# Patient Record
Sex: MALE
[De-identification: names, ages, dates, MRNs, and addresses within clinical notes are randomized per-mention and may not be internally consistent; named-entity substitution may affect disease eponyms.]

---

## 2019-02-13 ENCOUNTER — HOSPITAL ENCOUNTER (EMERGENCY)
Dept: HOSPITAL 31 - C.ER | Age: 59
Discharge: HOME | End: 2019-02-13
Payer: MEDICAID

## 2019-02-13 VITALS — TEMPERATURE: 98.2 F

## 2019-02-13 VITALS — HEART RATE: 90 BPM | DIASTOLIC BLOOD PRESSURE: 80 MMHG | OXYGEN SATURATION: 98 % | SYSTOLIC BLOOD PRESSURE: 117 MMHG

## 2019-02-13 VITALS — BODY MASS INDEX: 25.8 KG/M2

## 2019-02-13 VITALS — RESPIRATION RATE: 16 BRPM

## 2019-02-13 DIAGNOSIS — T78.40XA: Primary | ICD-10-CM

## 2019-02-13 PROCEDURE — 96374 THER/PROPH/DIAG INJ IV PUSH: CPT

## 2019-02-13 PROCEDURE — 96375 TX/PRO/DX INJ NEW DRUG ADDON: CPT

## 2019-02-13 PROCEDURE — 96361 HYDRATE IV INFUSION ADD-ON: CPT

## 2019-02-13 PROCEDURE — 99285 EMERGENCY DEPT VISIT HI MDM: CPT

## 2019-02-13 NOTE — C.PDOC
History Of Present Illness


Patient presents to the ER after he woke up at 0400 with swollen lips. He is 

currently able to speak in complete sentences. Denies drooling or known 

allergies.


Time Seen by Provider: 02/13/19 04:34


History Per: Patient


History/Exam Limitations: no limitations


Onset/Duration Of Symptoms: Hrs


Current Symptoms Are (Timing): Still Present


Possible Cause: Unknown


Associated Symptoms: Swelling (Lip)


Severity: Moderate


Pain Scale Rating Of: 4


Recent travel outside of the [place]: No





Past Medical History


Reviewed: Historical Data, Nursing Documentation, Vital Signs





- Medical History


PMH: Anxiety, Depression


   Denies: Diabetes, Hepatitis, HIV, HTN, Seizures, Sexually Transmitted Disease


Family History: States: No Known Family Hx





- Social History


Hx Alcohol Use: No


Hx Substance Use: No





Review Of Systems


Constitutional: Negative for: Fever, Chills


ENT: Positive for: Mouth Swelling


Cardiovascular: Negative for: Chest Pain, Palpitations


Respiratory: Negative for: Cough, Shortness of Breath, Wheezing


Gastrointestinal: Negative for: Nausea, Vomiting


Skin: Negative for: Rash


Neurological: Negative for: Weakness, Numbness





Physical Exam





- Physical Exam


Appears: Non-toxic


Skin: Warm, Dry


Head: Normacephalic


Eye(s): bilateral: Normal Inspection


Oral Mucosa: Moist, No Drooling


Tongue: Normal Appearing, No Swelling


Lips: Swelling (Upper and lower)


Throat: No Other (Swelling)


Neck: Trachea Midline, Supple


Chest: Symmetrical, No Tenderness


Cardiovascular: Rhythm Regular


Respiratory: No Rales, No Rhonchi, No Stridor, No Wheezing


Gastrointestinal/Abdominal: Soft, No Tenderness


Neurological/Psych: Oriented x3





ED Course And Treatment


O2 Sat by Pulse Oximetry: 97


Pulse Ox Interpretation: Normal


Progress Note: Benadryl, pepcid, solumedrol, and IV fluids administered.  6:21 

AM Lips less swollen. vital stable.





Critical Care Time





- Critical Care Note


Total Time (in mins): 30


Documented critical care: time excludes all time spent performing seperately 

billable procedures.





Disposition


Counseled Patient/Family Regarding: Studies Performed, Diagnosis





- Disposition


Disposition Time: 04:34


Condition: FAIR





- Clinical Impression


Clinical Impression: 


 Acute allergic reaction








- Scribe Statement


The provider has reviewed the documentation as recorded by the Scribjulieth Mosqueda





All medical record entries made by the Rogelioibjulieth were at my direction and 

personally dictated by me. I have reviewed the chart and agree that the record 

accurately reflects my personal performance of the history, physical exam, 

medical decision making, and the department course for this patient. I have also

personally directed, reviewed, and agree with the discharge instructions and 

disposition.





Physician Patient Turnover


Patient Signed Over To: Rosa Tejada


Handoff Comments: pending re-eval and disposition